# Patient Record
Sex: MALE | Race: WHITE | Employment: FULL TIME | URBAN - METROPOLITAN AREA
[De-identification: names, ages, dates, MRNs, and addresses within clinical notes are randomized per-mention and may not be internally consistent; named-entity substitution may affect disease eponyms.]

---

## 2017-09-26 PROBLEM — Z86.010 HISTORY OF ADENOMATOUS POLYP OF COLON: Status: ACTIVE | Noted: 2017-09-26

## 2017-11-15 PROCEDURE — 88305 TISSUE EXAM BY PATHOLOGIST: CPT | Performed by: INTERNAL MEDICINE

## 2018-08-17 ENCOUNTER — HOSPITAL ENCOUNTER (EMERGENCY)
Facility: HOSPITAL | Age: 53
Discharge: HOME | End: 2018-08-17
Attending: EMERGENCY MEDICINE
Payer: COMMERCIAL

## 2018-08-17 VITALS
HEIGHT: 73 IN | RESPIRATION RATE: 18 BRPM | DIASTOLIC BLOOD PRESSURE: 80 MMHG | BODY MASS INDEX: 25.84 KG/M2 | WEIGHT: 195 LBS | SYSTOLIC BLOOD PRESSURE: 128 MMHG | OXYGEN SATURATION: 97 % | TEMPERATURE: 98 F | HEART RATE: 55 BPM

## 2018-08-17 DIAGNOSIS — H10.33 ACUTE CONJUNCTIVITIS OF BOTH EYES, UNSPECIFIED ACUTE CONJUNCTIVITIS TYPE: Primary | ICD-10-CM

## 2018-08-17 PROCEDURE — 99283 EMERGENCY DEPT VISIT LOW MDM: CPT

## 2018-08-17 RX ORDER — CIPROFLOXACIN HYDROCHLORIDE 3.5 MG/ML
1 SOLUTION/ DROPS TOPICAL
Qty: 10 ML | Refills: 0 | Status: SHIPPED | OUTPATIENT
Start: 2018-08-17 | End: 2019-10-02 | Stop reason: ALTCHOICE

## 2018-08-17 RX ORDER — TETRACAINE HYDROCHLORIDE 5 MG/ML
1 SOLUTION OPHTHALMIC ONCE
Status: COMPLETED | OUTPATIENT
Start: 2018-08-17 | End: 2018-08-17

## 2018-08-18 NOTE — ED INITIAL ASSESSMENT (HPI)
PT c/o redness to both eyes for the last day, states he was seen a month ago for similar symptoms and dx with eye infection. Denies any crusting/drainage.

## 2018-08-18 NOTE — ED PROVIDER NOTES
Patient Seen in: [facility] AND [facility] Emergency Department    History   Patient presents with:  Eye Pain    Stated Complaint: bilateral eye pain (infection)    HPI    Pt complains of red left and right  eye  for few days . no trauma. no uri symptoms.   no no and agreed except as otherwise stated in HPI.     Physical Exam   ED Triage Vitals [08/17/18 2306]  BP: 128/80  Pulse: 55  Resp: 18  Temp: 98.3 °F (36.8 °C)  Temp src: n/a  SpO2: 97 %  O2 Device: n/a    Current:/80   Pulse 55   Temp 98.3 °F (36.8 °C)

## 2018-10-01 PROBLEM — E55.9 VITAMIN D DEFICIENCY: Status: ACTIVE | Noted: 2018-10-01

## 2018-10-01 PROBLEM — R73.01 IFG (IMPAIRED FASTING GLUCOSE): Status: ACTIVE | Noted: 2018-10-01

## 2018-10-08 PROCEDURE — 86803 HEPATITIS C AB TEST: CPT | Performed by: INTERNAL MEDICINE

## 2020-09-15 PROCEDURE — 88305 TISSUE EXAM BY PATHOLOGIST: CPT | Performed by: INTERNAL MEDICINE

## 2020-10-06 PROBLEM — N52.9 ERECTILE DYSFUNCTION, UNSPECIFIED ERECTILE DYSFUNCTION TYPE: Status: ACTIVE | Noted: 2020-10-06

## 2022-01-12 ENCOUNTER — LAB REQUISITION (OUTPATIENT)
Dept: LAB | Age: 57
End: 2022-01-12

## 2022-01-12 DIAGNOSIS — N20.1 CALCULUS OF URETER: ICD-10-CM

## 2022-01-12 PROCEDURE — 82365 CALCULUS SPECTROSCOPY: CPT | Performed by: CLINICAL MEDICAL LABORATORY

## 2022-01-15 LAB
APPEARANCE STONE: NORMAL
COMPN STONE: NORMAL
SPECIMEN WT: 80 MG

## (undated) NOTE — ED AVS SNAPSHOT
Allison Justin   MRN: [id number]    Department:  [facility] Emergency Department   Date of Visit:  8/17/2018           Disclosure     Insurance plans vary and the physician(s) referred by the ER may not be covered by your plan.  Please contact CARE PHYSICIAN AT ONCE OR RETURN IMMEDIATELY TO THE EMERGENCY DEPARTMENT. If you have been prescribed any medication(s), please fill your prescription right away and begin taking the medication(s) as directed.   If you believe that any of the medications